# Patient Record
Sex: MALE | Race: BLACK OR AFRICAN AMERICAN | NOT HISPANIC OR LATINO | Employment: FULL TIME | URBAN - METROPOLITAN AREA
[De-identification: names, ages, dates, MRNs, and addresses within clinical notes are randomized per-mention and may not be internally consistent; named-entity substitution may affect disease eponyms.]

---

## 2023-03-12 PROBLEM — J30.9 ALLERGIC RHINITIS: Status: ACTIVE | Noted: 2023-03-12

## 2023-03-12 PROBLEM — F41.8 DEPRESSION WITH ANXIETY: Status: ACTIVE | Noted: 2023-03-12

## 2023-03-12 PROBLEM — N52.9 MALE ERECTILE DISORDER: Status: ACTIVE | Noted: 2023-03-12

## 2023-03-23 ENCOUNTER — OFFICE VISIT (OUTPATIENT)
Dept: PRIMARY CARE | Facility: CLINIC | Age: 28
End: 2023-03-23
Payer: COMMERCIAL

## 2023-03-23 ENCOUNTER — LAB (OUTPATIENT)
Dept: LAB | Facility: LAB | Age: 28
End: 2023-03-23
Payer: COMMERCIAL

## 2023-03-23 VITALS
TEMPERATURE: 98 F | BODY MASS INDEX: 29.54 KG/M2 | DIASTOLIC BLOOD PRESSURE: 88 MMHG | OXYGEN SATURATION: 95 % | HEIGHT: 72 IN | WEIGHT: 218.1 LBS | SYSTOLIC BLOOD PRESSURE: 140 MMHG | HEART RATE: 74 BPM

## 2023-03-23 DIAGNOSIS — R03.0 ELEVATED BLOOD PRESSURE READING IN OFFICE WITHOUT DIAGNOSIS OF HYPERTENSION: ICD-10-CM

## 2023-03-23 DIAGNOSIS — Z11.3 ROUTINE SCREENING FOR STI (SEXUALLY TRANSMITTED INFECTION): ICD-10-CM

## 2023-03-23 DIAGNOSIS — Z76.89 ENCOUNTER TO ESTABLISH CARE: ICD-10-CM

## 2023-03-23 DIAGNOSIS — Z00.00 ANNUAL PHYSICAL EXAM: Primary | ICD-10-CM

## 2023-03-23 LAB
HEPATITIS C VIRUS AB PRESENCE IN SERUM: NONREACTIVE
HIV 1/ 2 AG/AB SCREEN: NONREACTIVE

## 2023-03-23 PROCEDURE — 36415 COLL VENOUS BLD VENIPUNCTURE: CPT

## 2023-03-23 PROCEDURE — 87389 HIV-1 AG W/HIV-1&-2 AB AG IA: CPT

## 2023-03-23 PROCEDURE — 86803 HEPATITIS C AB TEST: CPT

## 2023-03-23 PROCEDURE — 1036F TOBACCO NON-USER: CPT | Performed by: STUDENT IN AN ORGANIZED HEALTH CARE EDUCATION/TRAINING PROGRAM

## 2023-03-23 PROCEDURE — 99385 PREV VISIT NEW AGE 18-39: CPT | Performed by: STUDENT IN AN ORGANIZED HEALTH CARE EDUCATION/TRAINING PROGRAM

## 2023-03-23 ASSESSMENT — ENCOUNTER SYMPTOMS
SHORTNESS OF BREATH: 0
DYSURIA: 0
CHILLS: 0
FEVER: 0

## 2023-03-23 NOTE — PROGRESS NOTES
Subjective   Patient ID: Thang Bradley is a 27 y.o. male who presents for Annual Exam.    Mr. Bradley is here to establish care and to have an annual physical. He would also like testing for STIs. He denies any dysuria, no penile discharge.    Diet: Eats a lot of red meat.  Exercise: minimal    No hearing or vision concerns.    PHQ-2 negative.    PMH: none reported  PSH: none  Allergies: NKDA  Family history: stomach cancer and mesothelioma in grandfathers  Social history: Works maintenance for apartments. Lives alone. No tobacco use, social EtOH (2 drinks per week), uses marijuana and ectasy. Sexually active with women, uses condoms (maybe 50% of the time). Not monogamous.        Review of Systems   Constitutional:  Negative for chills and fever.   Respiratory:  Negative for shortness of breath.    Cardiovascular:  Negative for chest pain.   Genitourinary:  Negative for dysuria and penile discharge.   All other systems reviewed and are negative.      Objective   BP (!) 154/92 (BP Location: Right arm, Patient Position: Sitting, BP Cuff Size: Adult)   Pulse 74   Temp 36.7 °C (98 °F) (Temporal)   Wt 98.9 kg (218 lb 1.6 oz)   SpO2 95%  There is no height or weight on file to calculate BMI.    Physical Exam  Constitutional:       General: He is not in acute distress.     Appearance: Normal appearance.   HENT:      Head: Normocephalic and atraumatic.   Eyes:      Conjunctiva/sclera: Conjunctivae normal.   Cardiovascular:      Rate and Rhythm: Normal rate and regular rhythm.      Heart sounds: No murmur heard.     No friction rub. No gallop.   Pulmonary:      Effort: Pulmonary effort is normal. No respiratory distress.      Breath sounds: No wheezing, rhonchi or rales.   Abdominal:      General: Abdomen is flat.      Palpations: Abdomen is soft.      Tenderness: There is no abdominal tenderness.   Neurological:      Mental Status: He is alert.      Motor: No weakness.       Assessment/Plan   Problem List Items  Addressed This Visit          Circulatory    Elevated blood pressure reading in office without diagnosis of hypertension     -BP elevated on arrival to 150s/90s (above goal of <130/90)  -remains elevated on repeat to 140/88  -discussed DASH diet, increased physical activity  -continue to monitor            Other    Annual physical exam - Primary     -declined vaccinations today  -performed diet and exercise counseling  -counseled against substance use  -STI screening ordered (including HCV, HIV)          Other Visit Diagnoses       Routine screening for STI (sexually transmitted infection)        Relevant Orders    Hepatitis C Antibody (Completed)    HIV 1/2 Antigen/Antibody Screen with Reflex to Confirmation (Completed)    C. Trachomatis / N. Gonorrhoeae, Amplified Detection    TRICH VAGINALIS, AMPLIFIED    Encounter to establish care              Follow up in 3 months for blood pressure reassessment.    Patient discussed with Dr. Andrea.    Ron Davidson MD   PGY-2  [facility]

## 2023-03-23 NOTE — PATIENT INSTRUCTIONS
We ordered testing for sexually transmitted infections today including gonorrhea, chlamydia, hepatitis C, HIV, syphilis, and trichomonas.    Otherwise it seems like things are going well. Your blood pressure was running a little high today. We discussed some ways to improve your blood pressure including dietary changes and exercise:    To help you more effectively manage your high blood pressure, we would like to remind you of the following preventive measures you can take at home:    1) Eat right: Your diet should be rich in fruits, vegetables, potassium, and low-fat dairy products. You should also reduce your intake of sodium (salt) and fats, particularly saturated fats. Work on continuing to cut down on red meat and eating more salads. Grilled/baked chicken and fish are also good options, as are tofu and beans. Avoid fried foods which can be high in fats and oils.    2) Maintain a healthy weight: Try to achieve and maintain a healthy weight. If you are unsure what this means for you, please contact our clinic.    3) Exercise: Try to get at least 30 minutes of aerobic exercise every day. This can include something as simple as brisk walking.    4) Moderate your alcohol consumption: Limit your alcohol intake to one drink per day.

## 2023-03-24 LAB — TRICHOMONAS VAGINALIS: NEGATIVE

## 2023-03-25 LAB
CHLAMYDIA TRACH., AMPLIFIED: NEGATIVE
N. GONORRHEA, AMPLIFIED: NEGATIVE

## 2023-03-30 PROBLEM — Z00.00 ANNUAL PHYSICAL EXAM: Status: ACTIVE | Noted: 2023-03-30

## 2023-03-30 PROBLEM — R03.0 ELEVATED BLOOD PRESSURE READING IN OFFICE WITHOUT DIAGNOSIS OF HYPERTENSION: Status: ACTIVE | Noted: 2023-03-30

## 2023-03-30 NOTE — ASSESSMENT & PLAN NOTE
-BP elevated on arrival to 150s/90s (above goal of <130/90)  -remains elevated on repeat to 140/88  -discussed DASH diet, increased physical activity  -continue to monitor

## 2023-03-30 NOTE — ASSESSMENT & PLAN NOTE
-declined vaccinations today  -performed diet and exercise counseling  -counseled against substance use  -STI screening ordered (including HCV, HIV)

## 2023-04-08 NOTE — PROGRESS NOTES
I reviewed with the resident the medical history and the resident’s findings on physical examination.  I discussed with the resident the patient’s diagnosis and concur with the treatment plan as documented in the resident note.     Michelle Andrea MD